# Patient Record
Sex: MALE | URBAN - METROPOLITAN AREA
[De-identification: names, ages, dates, MRNs, and addresses within clinical notes are randomized per-mention and may not be internally consistent; named-entity substitution may affect disease eponyms.]

---

## 2020-12-07 ENCOUNTER — OFFICE VISIT (OUTPATIENT)
Dept: URBAN - METROPOLITAN AREA CLINIC 22 | Facility: CLINIC | Age: 70
End: 2020-12-07
Payer: MEDICARE

## 2020-12-07 DIAGNOSIS — H02.889 MEIBOMIAN GLAND DYSFUNCTION OF EYE: ICD-10-CM

## 2020-12-07 DIAGNOSIS — T15.12XA FOREIGN BODY IN CONJUNCTIVAL SAC OF LEFT EYE, INITIAL ENCOUNTER: Primary | ICD-10-CM

## 2020-12-07 PROCEDURE — 99204 OFFICE O/P NEW MOD 45 MIN: CPT | Performed by: OPTOMETRIST

## 2020-12-07 NOTE — IMPRESSION/PLAN
Impression: Foreign body in conjunctival sac of left eye, initial encounter: T15.12XA.  Plan: art tears q1h today

## 2020-12-07 NOTE — IMPRESSION/PLAN
Impression: Foreign body in conjunctival sac of left eye, initial encounter: T15.12XA. Plan: No Fb found adn lavage.

## 2020-12-07 NOTE — IMPRESSION/PLAN
Impression: Meibomian gland dysfunction of eye: H02.889. Plan: WCs w baby shampoo QA and QHS. If fever, swelling, warm to touch, malaise, seek ER immediately.

## 2024-05-06 ENCOUNTER — OFFICE VISIT (OUTPATIENT)
Dept: URBAN - METROPOLITAN AREA CLINIC 27 | Facility: CLINIC | Age: 74
End: 2024-05-06
Payer: MEDICARE

## 2024-05-06 DIAGNOSIS — H35.373 PUCKERING OF MACULA, BILATERAL: Primary | ICD-10-CM

## 2024-05-06 DIAGNOSIS — H43.813 VITREOUS DEGENERATION, BILATERAL: ICD-10-CM

## 2024-05-06 DIAGNOSIS — H30.93 UNSPECIFIED CHORIORETINAL INFLAMMATION, BILATERAL: ICD-10-CM

## 2024-05-06 DIAGNOSIS — H25.13 AGE-RELATED NUCLEAR CATARACT, BILATERAL: ICD-10-CM

## 2024-05-06 PROCEDURE — 92250 FUNDUS PHOTOGRAPHY W/I&R: CPT | Performed by: OPHTHALMOLOGY

## 2024-05-06 PROCEDURE — 99204 OFFICE O/P NEW MOD 45 MIN: CPT | Performed by: OPHTHALMOLOGY

## 2024-05-06 PROCEDURE — 92242 FLUORESCEIN&ICG ANGIOGRAPHY: CPT | Performed by: OPHTHALMOLOGY

## 2024-05-06 ASSESSMENT — INTRAOCULAR PRESSURE
OD: 15
OS: 13

## 2024-11-05 ENCOUNTER — OFFICE VISIT (OUTPATIENT)
Dept: URBAN - METROPOLITAN AREA CLINIC 27 | Facility: CLINIC | Age: 74
End: 2024-11-05
Payer: MEDICARE

## 2024-11-05 DIAGNOSIS — H30.93 UNSPECIFIED CHORIORETINAL INFLAMMATION, BILATERAL: ICD-10-CM

## 2024-11-05 DIAGNOSIS — H25.13 AGE-RELATED NUCLEAR CATARACT, BILATERAL: ICD-10-CM

## 2024-11-05 DIAGNOSIS — H43.813 VITREOUS DEGENERATION, BILATERAL: ICD-10-CM

## 2024-11-05 DIAGNOSIS — H35.373 PUCKERING OF MACULA, BILATERAL: Primary | ICD-10-CM

## 2024-11-05 PROCEDURE — 99214 OFFICE O/P EST MOD 30 MIN: CPT | Performed by: OPHTHALMOLOGY

## 2024-11-05 RX ORDER — VIT E/DHA/LUT/ZEAX/ASTAX/BILB 25-220-5
CAPSULE ORAL
Qty: 60 | Refills: 10 | Status: ACTIVE
Start: 2024-11-05

## 2024-11-05 ASSESSMENT — INTRAOCULAR PRESSURE
OD: 13
OS: 14

## 2025-06-09 ENCOUNTER — OFFICE VISIT (OUTPATIENT)
Dept: URBAN - METROPOLITAN AREA CLINIC 27 | Facility: CLINIC | Age: 75
End: 2025-06-09
Payer: MEDICARE

## 2025-06-09 DIAGNOSIS — H30.93 UNSPECIFIED CHORIORETINAL INFLAMMATION, BILATERAL: ICD-10-CM

## 2025-06-09 DIAGNOSIS — H35.373 PUCKERING OF MACULA, BILATERAL: Primary | ICD-10-CM

## 2025-06-09 DIAGNOSIS — H25.13 AGE-RELATED NUCLEAR CATARACT, BILATERAL: ICD-10-CM

## 2025-06-09 DIAGNOSIS — H43.813 VITREOUS DEGENERATION, BILATERAL: ICD-10-CM

## 2025-06-09 PROCEDURE — 92235 FLUORESCEIN ANGRPH MLTIFRAME: CPT | Performed by: OPHTHALMOLOGY

## 2025-06-09 PROCEDURE — 99214 OFFICE O/P EST MOD 30 MIN: CPT | Performed by: OPHTHALMOLOGY

## 2025-06-09 PROCEDURE — 92134 CPTRZ OPH DX IMG PST SGM RTA: CPT | Performed by: OPHTHALMOLOGY

## 2025-06-09 ASSESSMENT — INTRAOCULAR PRESSURE
OS: 14
OD: 14